# Patient Record
Sex: FEMALE | Race: AMERICAN INDIAN OR ALASKA NATIVE
[De-identification: names, ages, dates, MRNs, and addresses within clinical notes are randomized per-mention and may not be internally consistent; named-entity substitution may affect disease eponyms.]

---

## 2018-01-09 ENCOUNTER — HOSPITAL ENCOUNTER (EMERGENCY)
Dept: HOSPITAL 5 - ED | Age: 48
Discharge: HOME | End: 2018-01-09
Payer: SELF-PAY

## 2018-01-09 VITALS — DIASTOLIC BLOOD PRESSURE: 76 MMHG | SYSTOLIC BLOOD PRESSURE: 115 MMHG

## 2018-01-09 DIAGNOSIS — R11.2: ICD-10-CM

## 2018-01-09 DIAGNOSIS — Z98.51: ICD-10-CM

## 2018-01-09 DIAGNOSIS — J09.X2: Primary | ICD-10-CM

## 2018-01-09 PROCEDURE — 99283 EMERGENCY DEPT VISIT LOW MDM: CPT

## 2018-01-09 PROCEDURE — 96360 HYDRATION IV INFUSION INIT: CPT

## 2018-01-09 PROCEDURE — 87400 INFLUENZA A/B EACH AG IA: CPT

## 2018-01-09 PROCEDURE — 71046 X-RAY EXAM CHEST 2 VIEWS: CPT

## 2018-01-09 NOTE — EMERGENCY DEPARTMENT REPORT
- General


Chief Complaint: Upper Respiratory Infection


Stated Complaint: BODY ACHES FLU SYMPTOMS


Time Seen by Provider: 01/09/18 10:58


Source: patient


Mode of arrival: Ambulatory


Limitations: No Limitations





- History of Present Illness


Initial Comments: 





This is a 47-year-old female nontoxic, well nourished in appearance, no acute 

signs of distress presents to the ED with c/o of cough, rhinorrhea, fever, body 

aches, chills 2 days.  Patient describes cough as dry mucous production.  

Patient denies any chest pain, short of breath, difficult breathing, nausea, 

vomiting, wheezing, back pain, headache, stiff neck, numbness or tingling.  

Patient denies any calf pain or calf tenderness.  Patient denies hemoptysis.  

Denies long car rides, recent hospital stays, or recent travels.  Patient 

denies any allergies.  Patient denies significant past medical history.


MD Complaint: fever, cough, rhinorrhea, nasal congestion, other (body aches)


-: days(s) (2)


Severity: mild


Severity scale (0 -10): 8


Quality: aching


Consistency: constant


Improves With: nothing


Worsens With: nothing


Associated Symptoms: fever, chills, rhinorrhea, nasal congestion, cough.  denies

: myalgias, diaphoresis, headache, sore throat, stiff neck, chest pain, 

shortness of breath, abdominal pain, nausea, vomiting, diarrhea, dysuria, rash, 

confusion, right sweats, weight loss, epistaxis, hoarseness, ear pain


Treatments Prior to Arrival: none





- Related Data


 Previous Rx's











 Medication  Instructions  Recorded  Last Taken  Type


 


Benzonatate [Tessalon Perle] 100 mg PO Q6H PRN #20 capsule 01/09/18 Unknown Rx


 


Ibuprofen [Motrin] 600 mg PO Q8H PRN #30 tablet 01/09/18 Unknown Rx


 


Oseltamivir [Tamiflu] 75 mg PO BID #14 cap 01/09/18 Unknown Rx











 Allergies











Allergy/AdvReac Type Severity Reaction Status Date / Time


 


No Known Allergies Allergy   Unverified 01/09/18 07:29














ED Review of Systems


ROS: 


Stated complaint: BODY ACHES FLU SYMPTOMS


Other details as noted in HPI





Constitutional: chills, fever


Eyes: denies: eye pain, eye discharge, vision change


ENT: denies: ear pain, throat pain


Respiratory: cough.  denies: shortness of breath, wheezing


Cardiovascular: denies: chest pain, palpitations


Endocrine: no symptoms reported


Gastrointestinal: denies: abdominal pain, nausea, diarrhea


Genitourinary: denies: urgency, dysuria, discharge


Musculoskeletal: denies: back pain, joint swelling, arthralgia


Skin: denies: rash, lesions


Neurological: denies: headache, weakness, paresthesias


Psychiatric: denies: anxiety, depression


Hematological/Lymphatic: denies: easy bleeding, easy bruising





ED Past Medical Hx





- Past Medical History


Previous Medical History?: Yes





- Surgical History


Past Surgical History?: Yes


Additional Surgical History: Tubaligation





- Social History


Smoking Status: Never Smoker


Substance Use Type: Alcohol, Non Opiate Pain





- Medications


Home Medications: 


 Home Medications











 Medication  Instructions  Recorded  Confirmed  Last Taken  Type


 


Benzonatate [Tessalon Perle] 100 mg PO Q6H PRN #20 capsule 01/09/18  Unknown Rx


 


Ibuprofen [Motrin] 600 mg PO Q8H PRN #30 tablet 01/09/18  Unknown Rx


 


Oseltamivir [Tamiflu] 75 mg PO BID #14 cap 01/09/18  Unknown Rx














ED Physical Exam





- General


Limitations: No Limitations


General appearance: alert, in no apparent distress





- Head


Head exam: Present: atraumatic, normocephalic, normal inspection





- Eye


Eye exam: Present: normal appearance, PERRL, EOMI.  Absent: scleral icterus, 

conjunctival injection, nystagmus, periorbital swelling, periorbital tenderness


Pupils: Present: normal accommodation





- ENT


ENT exam: Present: normal exam, normal orophraynx, mucous membranes moist, TM's 

normal bilaterally, normal external ear exam





- Neck


Neck exam: Present: normal inspection, full ROM.  Absent: tenderness, 

meningismus, lymphadenopathy, thyromegaly





- Respiratory


Respiratory exam: Present: normal lung sounds bilaterally.  Absent: respiratory 

distress, wheezes, rales, rhonchi, stridor, chest wall tenderness, accessory 

muscle use, decreased breath sounds, prolonged expiratory





- Cardiovascular


Cardiovascular Exam: Present: regular rate, normal rhythm, normal heart sounds.

  Absent: irregular rhythm, systolic murmur, diastolic murmur, rubs, gallop





- GI/Abdominal


GI/Abdominal exam: Present: soft, normal bowel sounds.  Absent: distended, 

tenderness, guarding, rebound, rigid, diminished bowel sounds





- Rectal


Rectal exam: Present: deferred





- Extremities Exam


Extremities exam: Present: normal inspection, full ROM, normal capillary 

refill.  Absent: tenderness, pedal edema, joint swelling, calf tenderness





- Back Exam


Back exam: Present: normal inspection, full ROM.  Absent: tenderness, CVA 

tenderness (R), CVA tenderness (L), muscle spasm, paraspinal tenderness, 

vertebral tenderness, rash noted





- Neurological Exam


Neurological exam: Present: alert, oriented X3, CN II-XII intact, normal gait, 

reflexes normal





- Psychiatric


Psychiatric exam: Present: normal affect, normal mood





- Skin


Skin exam: Present: warm, dry, intact, normal color.  Absent: rash





ED Course





 Vital Signs











  01/09/18





  07:29


 


Temperature 99.9 F H


 


Pulse Rate 111 H


 


Respiratory 20





Rate 


 


Blood Pressure 128/81


 


O2 Sat by Pulse 98





Oximetry 














- Reevaluation(s)


Reevaluation #1: 





01/09/18 12:51


Patient is speaking in full sentences with no signs of distress noted.





ED Medical Decision Making





- Medical Decision Making





This is a 47-year-old female presents with a positive influenza A.  Patient is 

stable and was examined by me.  Chest x-ray has been obtained and dictated by 

radiologist with normal exam.  He is notified of x-ray results were negative by 

the patient.  Influenza AB obtained and positive for a period she received 1 L 

of normal saline and Motrin which patient states symptoms of body aches is 

improving and are subsided.  Patient discharged with Tamiflu, Tessalon Perles, 

and Motrin for fever.  Patient was instructed to increase hydration and rest.  

Patient was instructed Follow-up with a primary care doctor in 3-5 days or if 

symptoms worsen and continue return to emergency room as soon as possible. At 

time time of discharge, the patient does not seem toxic or ill in appearance.  

No acute signs of distress noted.  Patient agrees to discharge treatment plan 

of care.  No further questions noted by the patient.


Critical care attestation.: 


If time is entered above; I have spent that time in minutes in the direct care 

of this critically ill patient, excluding procedure time.








ED Disposition


Clinical Impression: 


 Influenza A





Disposition: DC-01 TO HOME OR SELFCARE


Is pt being admited?: No


Does the pt Need Aspirin: No


Condition: Stable


Additional Instructions: 


Follow-up with a primary care doctor in 3-5 days or if symptoms worsen and 

continue return to emergency room as soon as possible. 


Increase hydration and rest.


Take Motrin as prescribed for the fever episodes.


Prescriptions: 


Benzonatate [Tessalon Perle] 100 mg PO Q6H PRN #20 capsule


 PRN Reason: cough


Ibuprofen [Motrin] 600 mg PO Q8H PRN #30 tablet


 PRN Reason: Fever


Oseltamivir [Tamiflu] 75 mg PO BID #14 cap


Referrals: 


PRIMARY CARE,MD [Primary Care Provider] - 3-5 Days


BASSEM ZULUAGA MD [Staff Physician] - 3-5 Days


[facility] [Outside] - 3-5 Days


[facility] [Outside] - 3-5 Days


Forms:  Work/School Release Form(ED)

## 2019-01-09 ENCOUNTER — HOSPITAL ENCOUNTER (EMERGENCY)
Dept: HOSPITAL 5 - ED | Age: 49
Discharge: LEFT BEFORE DISCHARGE | End: 2019-01-09
Payer: SELF-PAY

## 2019-01-14 ENCOUNTER — HOSPITAL ENCOUNTER (EMERGENCY)
Dept: HOSPITAL 5 - ED | Age: 49
Discharge: HOME | End: 2019-01-14
Payer: SELF-PAY

## 2019-01-14 VITALS — DIASTOLIC BLOOD PRESSURE: 87 MMHG | SYSTOLIC BLOOD PRESSURE: 134 MMHG

## 2019-01-14 DIAGNOSIS — Z98.51: ICD-10-CM

## 2019-01-14 DIAGNOSIS — R05: ICD-10-CM

## 2019-01-14 DIAGNOSIS — N92.6: ICD-10-CM

## 2019-01-14 DIAGNOSIS — N92.4: ICD-10-CM

## 2019-01-14 DIAGNOSIS — N39.0: Primary | ICD-10-CM

## 2019-01-14 LAB
ALBUMIN SERPL-MCNC: 3.9 G/DL (ref 3.9–5)
ALT SERPL-CCNC: 9 UNITS/L (ref 7–56)
BILIRUB UR QL STRIP: (no result)
BLOOD UR QL VISUAL: (no result)
BUN SERPL-MCNC: 9 MG/DL (ref 7–17)
BUN/CREAT SERPL: 15 %
CALCIUM SERPL-MCNC: 8.9 MG/DL (ref 8.4–10.2)
HCT VFR BLD CALC: 38 % (ref 30.3–42.9)
HEMOLYSIS INDEX: 8
HGB BLD-MCNC: 12.8 GM/DL (ref 10.1–14.3)
MCHC RBC AUTO-ENTMCNC: 34 % (ref 30–34)
MCV RBC AUTO: 90 FL (ref 79–97)
MUCOUS THREADS #/AREA URNS HPF: (no result) /HPF
PH UR STRIP: 6 [PH] (ref 5–7)
PLATELET # BLD: 285 K/MM3 (ref 140–440)
PROT UR STRIP-MCNC: (no result) MG/DL
RBC # BLD AUTO: 4.22 M/MM3 (ref 3.65–5.03)
RBC #/AREA URNS HPF: 7 /HPF (ref 0–6)
UROBILINOGEN UR-MCNC: < 2 MG/DL (ref ?–2)
WBC #/AREA URNS HPF: 15 /HPF (ref 0–6)

## 2019-01-14 PROCEDURE — 81025 URINE PREGNANCY TEST: CPT

## 2019-01-14 PROCEDURE — 85027 COMPLETE CBC AUTOMATED: CPT

## 2019-01-14 PROCEDURE — 36415 COLL VENOUS BLD VENIPUNCTURE: CPT

## 2019-01-14 PROCEDURE — 80053 COMPREHEN METABOLIC PANEL: CPT

## 2019-01-14 PROCEDURE — 81001 URINALYSIS AUTO W/SCOPE: CPT

## 2019-01-14 PROCEDURE — 99283 EMERGENCY DEPT VISIT LOW MDM: CPT

## 2019-01-14 NOTE — EMERGENCY DEPARTMENT REPORT
ED Abdominal Pain HPI





- General


Chief Complaint: Abdominal Pain


Stated Complaint: COUGHING/ECTOPIC PREG


Time Seen by Provider: 01/14/19 08:03


Source: patient


Mode of arrival: Ambulatory


Limitations: No Limitations





- Related Data


                                  Previous Rx's











 Medication  Instructions  Recorded  Last Taken  Type


 


Benzonatate [Tessalon Perle] 100 mg PO Q6H PRN #20 capsule 01/14/19 Unknown Rx


 


Sulfamethoxazole/Trimethoprim 1 each PO BID #6 tablet 01/14/19 Unknown Rx





[Bactrim DS TAB]    











                                    Allergies











Allergy/AdvReac Type Severity Reaction Status Date / Time


 


No Known Allergies Allergy   Unverified 01/09/18 07:29














ED Review of Systems


ROS: 


Stated complaint: COUGHING/ECTOPIC PREG


Other details as noted in HPI





Comment: All other systems reviewed and negative


Constitutional: denies: chills, fever


Eyes: denies: eye pain


ENT: denies: ear pain


Respiratory: see HPI, cough


Cardiovascular: denies: palpitations


Endocrine: denies: flushing


Gastrointestinal: as per HPI, abdominal pain (llq).  denies: nausea


Genitourinary: as per HPI, abnormal menses.  denies: urgency, dysuria, 

frequency, hematuria, discharge


Musculoskeletal: denies: back pain


Skin: denies: rash


Neurological: denies: headache





ED Past Medical Hx





- Past Medical History


Previous Medical History?: No





- Surgical History


Past Surgical History?: Yes


Additional Surgical History: Tubaligation





- Social History


Smoking Status: Never Smoker


Substance Use Type: None





- Medications


Home Medications: 


                                Home Medications











 Medication  Instructions  Recorded  Confirmed  Last Taken  Type


 


Benzonatate [Tessalon Perle] 100 mg PO Q6H PRN #20 capsule 01/14/19  Unknown Rx


 


Sulfamethoxazole/Trimethoprim 1 each PO BID #6 tablet 01/14/19  Unknown Rx





[Bactrim DS TAB]     














ED Physical Exam





- General


Limitations: No Limitations


General appearance: alert





- Head


Head exam: Present: atraumatic





- Eye


Eye exam: Present: normal appearance, PERRL


Pupils: Present: normal accommodation





- Neck


Neck exam: Present: normal inspection





- Respiratory


Respiratory exam: Present: normal lung sounds bilaterally





- Cardiovascular


Cardiovascular Exam: Present: regular rate, normal rhythm





- GI/Abdominal


GI/Abdominal exam: Present: soft, normal bowel sounds.  Absent: distended, 

tenderness, guarding, rebound, rigid, diminished bowel sounds, hyperactive bowel

sounds, hypoactive bowel sounds, organomegaly, mass





- Rectal


Rectal exam: Present: deferred





- 


External exam: Present: other (on menses- told me after urinalysis came back 

when I proceeded to do pelvic)





- Extremities Exam


Extremities exam: Present: normal inspection, full ROM





- Back Exam


Back exam: Present: normal inspection, full ROM.  Absent: tenderness, CVA 

tenderness (R), CVA tenderness (L)





- Neurological Exam


Neurological exam: Present: alert, oriented X3





ED Course


                                   Vital Signs











  01/14/19





  07:35


 


Temperature 97.7 F


 


Pulse Rate 83


 


Respiratory 12





Rate 


 


Blood Pressure 134/87


 


O2 Sat by Pulse 99





Oximetry 














ED Medical Decision Making





- Lab Data


Result diagrams: 


                                 01/14/19 08:17





                                 01/14/19 08:17





- Medical Decision Making


pt states irregular menses; she thinks she may have ectopic; discussed menopause





;  at bedside; no concern for STI;  w no symptoms





no n/v/diarrhea; no back pain; no CVA tenderness; no bloody stools





urine noted 


will tx for uti


ambulatory 


taking po


afebrile





dc home with dc plan of care. 





Labs











  01/14/19 01/14/19 01/14/19





  07:49 08:17 08:17


 


WBC   10.2 


 


RBC   4.22 


 


Hgb   12.8 


 


Hct   38.0 


 


MCV   90 


 


MCH   30 


 


MCHC   34 


 


RDW   14.8 


 


Plt Count   285 


 


Sodium    139


 


Potassium    4.1


 


Chloride    101.2


 


Carbon Dioxide    27


 


Anion Gap    15


 


BUN    9


 


Creatinine    0.6 L


 


Estimated GFR    > 60


 


BUN/Creatinine Ratio    15


 


Glucose    98


 


Calcium    8.9


 


Total Bilirubin    0.20


 


AST    14


 


ALT    9


 


Alkaline Phosphatase    84


 


Total Protein    7.1


 


Albumin    3.9


 


Albumin/Globulin Ratio    1.2


 


Urine Color  Yellow  


 


Urine Turbidity  Clear  


 


Urine pH  6.0  


 


Ur Specific Gravity  1.013  


 


Urine Protein  <15 mg/dl  


 


Urine Glucose (UA)  Neg  


 


Urine Ketones  Neg  


 


Urine Blood  Sm  


 


Urine Nitrite  Neg  


 


Urine Bilirubin  Neg  


 


Urine Urobilinogen  < 2.0  


 


Ur Leukocyte Esterase  Tr  


 


Urine WBC (Auto)  15.0 H  


 


Urine RBC (Auto)  7.0  


 


U Epithel Cells (Auto)  1.0  


 


Urine Mucus  Few  


 


Urine HCG, Qual  Negative  














- Differential Diagnosis


uti v sti v ectopic v divertiuclar disease


Critical care attestation.: 


If time is entered above; I have spent that time in minutes in the direct care 

of this critically ill patient, excluding procedure time.








ED Disposition


Clinical Impression: 


 UTI (urinary tract infection), Cough, Menses, irregular, Menopausal bleeding





Disposition: DC-01 TO HOME OR SELFCARE


Is pt being admited?: No


Does the pt Need Aspirin: No


Condition: Stable


Instructions:  Urinary Tract Infection in Women (ED), Cold Symptoms (ED)


Additional Instructions: 


motrin or tylenol for fever





meds as ordered today





hydrate well with water





urinate after sex





follow up pcp if persists


see referral below


Referrals: 


PRIMARY CARE,MD [Primary Care Provider] - 3-5 Days


NERY GLEZ MD [Staff Physician] - 3-5 Days


Time of Disposition: 09:05